# Patient Record
Sex: FEMALE | URBAN - METROPOLITAN AREA
[De-identification: names, ages, dates, MRNs, and addresses within clinical notes are randomized per-mention and may not be internally consistent; named-entity substitution may affect disease eponyms.]

---

## 2018-08-23 ENCOUNTER — APPOINTMENT (RX ONLY)
Age: 48
Setting detail: DERMATOLOGY
End: 2018-08-23

## 2018-08-23 DIAGNOSIS — L88 PYODERMA GANGRENOSUM: ICD-10-CM

## 2018-08-23 DIAGNOSIS — I87.2 VENOUS INSUFFICIENCY (CHRONIC) (PERIPHERAL): ICD-10-CM

## 2018-08-23 PROCEDURE — ? DIAGNOSIS COMMENT

## 2018-08-23 PROCEDURE — ? COUNSELING

## 2018-08-23 PROCEDURE — ? PRESCRIPTION

## 2018-08-23 PROCEDURE — ? ORDER TESTS

## 2018-08-23 PROCEDURE — 99213 OFFICE O/P EST LOW 20 MIN: CPT

## 2018-08-23 RX ORDER — PREDNISONE 5 MG/1
TABLET ORAL
Qty: 65 | Refills: 0 | Status: ERX

## 2018-08-23 RX ORDER — CEPHALEXIN 500 MG/1
CAPSULE ORAL
Qty: 40 | Refills: 0 | Status: ERX | COMMUNITY
Start: 2018-08-23

## 2018-08-23 RX ADMIN — CEPHALEXIN: 500 CAPSULE ORAL at 00:00

## 2018-08-23 ASSESSMENT — LOCATION SIMPLE DESCRIPTION DERM: LOCATION SIMPLE: RIGHT PRETIBIAL REGION

## 2018-08-23 ASSESSMENT — LOCATION ZONE DERM: LOCATION ZONE: LEG

## 2018-08-23 ASSESSMENT — LOCATION DETAILED DESCRIPTION DERM: LOCATION DETAILED: RIGHT PROXIMAL PRETIBIAL REGION

## 2018-08-23 NOTE — HPI: FREE FORM (INITIAL HISTORY)
How Severe Is Your Skin Condition? (The Patient Describes The Severity Level As....): moderate
What Brings You In Today? (This Is An Xx Year Old Patient Who Presents For...): pyogenic grangrenosum
When Did You First Notice It? (The Patient First Noticed It...): 13days ago
Where On Your Body Is It? (Located On...): right leg, near previous lesion
Any Associated Symptoms? (The Symptoms Include.....): ulceration, erythema and swelling
How Did This Start? (The Symptoms Started...): after a vacuum fell on her leg

## 2018-08-23 NOTE — PROCEDURE: DIAGNOSIS COMMENT
Detail Level: Simple
Comment: She has a component of venous stasis. She was encouraged to wear compression hose and elevate legs as much as possible.
Comment: She has an ulcer with rolled borders on the right lower leg. She has quite a bit of erythema and edema. See photos.\\n- Culture taken\\n- start cephalexin and prednisone, see Rx\\n- stressed the importance of compression hose and leg elevation. We need to treat the edema in order for the ulcer to start to heal.\\n- side effects of prednisone reviewed. She has taken it before.\\n- clobetasol to edge of ulcer, vaseline to ulcer base\\n- f/u 2 weeks, sooner if needed

## 2018-09-04 ENCOUNTER — APPOINTMENT (RX ONLY)
Age: 48
Setting detail: DERMATOLOGY
End: 2018-09-04

## 2018-09-04 DIAGNOSIS — L88 PYODERMA GANGRENOSUM: ICD-10-CM

## 2018-09-04 PROCEDURE — 99212 OFFICE O/P EST SF 10 MIN: CPT

## 2018-09-04 PROCEDURE — ? DIAGNOSIS COMMENT

## 2018-09-04 NOTE — HPI: FREE FORM (FOLLOW UP HISTORY)
How Severe Is Your Skin Condition?: moderate
What Brings You In Today For Follow Up? (This Is An Xx Year Old Patient Who Is Following Up For:): Pyoderma Gangrenosum
Where On Your Body Is It? (Located On:): right leg
What Was It Treated With? (The Condition Was Treated With:): clobetasol and vaseline
Since The Treatment Is Your Condition Better, Worse, Or Unchanged? (Since The Treatment, The Condition Is:): worse, it's larger

## 2018-09-04 NOTE — PROCEDURE: DIAGNOSIS COMMENT
Detail Level: Simple
Comment: Ulcer is bigger in diameter but not deeper. She has quite a bit of edema and erythema surrounding the ulcer. I don't think she has an infection. \\n-Discussed the importance of staying off her feet as much as possible and wearing compression hose when she is on her feet. We need to decrease the edeme in order for this ulcer to heal.\\n-Increase clobetasol from every 2 days to 1-2 times per day.\\n-hold off on further antibiotics for now\\n- Follow up in 1 week

## 2018-09-12 ENCOUNTER — APPOINTMENT (RX ONLY)
Age: 48
Setting detail: DERMATOLOGY
End: 2018-09-12

## 2018-09-12 DIAGNOSIS — I87.2 VENOUS INSUFFICIENCY (CHRONIC) (PERIPHERAL): ICD-10-CM

## 2018-09-12 DIAGNOSIS — L88 PYODERMA GANGRENOSUM: ICD-10-CM

## 2018-09-12 PROCEDURE — ? OTHER

## 2018-09-12 PROCEDURE — 99213 OFFICE O/P EST LOW 20 MIN: CPT

## 2018-09-12 PROCEDURE — ? DIAGNOSIS COMMENT

## 2018-09-12 PROCEDURE — ? ORDER TESTS

## 2018-09-12 NOTE — PROCEDURE: OTHER
Detail Level: Zone
Note Text (......Xxx Chief Complaint.): This diagnosis correlates with the
Other (Free Text): - handout given with info for [employer] so she can look into better compression hose. Discussed that the ulcer will not improve if edema present.\\n- continue clobetasol to ulcer edge, and aquaphor to ulcer base\\n- ulcer cleaned with sterile saline\\n- aqauphor, telfa, and gauze applied with hypafix\\n- Ace bandage applied for better compression\\n- keep ulcer covered when wearing compression hose.\\n- f/u one week, sooner if needed.

## 2018-09-12 NOTE — PROCEDURE: DIAGNOSIS COMMENT
Comment: still with quite a bit of edema. Recommended new compression hose at [facility] in [place]
Detail Level: Zone
Comment: Ulcer not smaller. Will reculture. If not better next week, consider antibiotics, although the previous bacteria is likely not pathogenic.
Detail Level: Simple

## 2018-09-12 NOTE — HPI: FREE FORM (FOLLOW UP HISTORY)
How Severe Is Your Skin Condition?: moderate
What Brings You In Today For Follow Up? (This Is An Xx Year Old Patient Who Is Following Up For:): Pyoderma gangrenosum and stasis dermatitis
Where On Your Body Is It? (Located On:): Right medial lower leg
What Was It Treated With? (The Condition Was Treated With:): Clobetasol and compression
Since The Treatment Is Your Condition Better, Worse, Or Unchanged? (Since The Treatment, The Condition Is:): Unchanged, patient reports that the ulcer is about the same in size.

## 2018-09-19 ENCOUNTER — RX ONLY (OUTPATIENT)
Age: 48
Setting detail: RX ONLY
End: 2018-09-19

## 2018-09-19 ENCOUNTER — APPOINTMENT (RX ONLY)
Age: 48
Setting detail: DERMATOLOGY
End: 2018-09-19

## 2018-09-19 DIAGNOSIS — L88 PYODERMA GANGRENOSUM: ICD-10-CM | Status: UNCHANGED

## 2018-09-19 DIAGNOSIS — I87.2 VENOUS INSUFFICIENCY (CHRONIC) (PERIPHERAL): ICD-10-CM

## 2018-09-19 PROCEDURE — ? PRESCRIPTION

## 2018-09-19 PROCEDURE — ? DIAGNOSIS COMMENT

## 2018-09-19 PROCEDURE — 99213 OFFICE O/P EST LOW 20 MIN: CPT

## 2018-09-19 PROCEDURE — ? OTHER

## 2018-09-19 RX ORDER — AMOXICILLIN AND CLAVULANATE POTASSIUM 875; 125 MG/1; 1/1
TABLET, FILM COATED ORAL
Qty: 14 | Refills: 0 | Status: ERX | COMMUNITY
Start: 2018-09-19

## 2018-09-19 RX ORDER — PREDNISONE 5 MG/1
TABLET ORAL
Qty: 65 | Refills: 0 | Status: ERX

## 2018-09-19 RX ADMIN — AMOXICILLIN AND CLAVULANATE POTASSIUM: 875; 125 TABLET, FILM COATED ORAL at 23:08

## 2018-09-19 ASSESSMENT — LOCATION DETAILED DESCRIPTION DERM: LOCATION DETAILED: RIGHT DISTAL PRETIBIAL REGION

## 2018-09-19 ASSESSMENT — LOCATION ZONE DERM: LOCATION ZONE: LEG

## 2018-09-19 ASSESSMENT — LOCATION SIMPLE DESCRIPTION DERM: LOCATION SIMPLE: RIGHT PRETIBIAL REGION

## 2018-09-19 NOTE — HPI: FREE FORM (FOLLOW UP HISTORY)
How Severe Is Your Skin Condition?: moderate
What Brings You In Today For Follow Up? (This Is An Xx Year Old Patient Who Is Following Up For:): Pyoderma gangrenosum and stasis dermatitis
Where On Your Body Is It? (Located On:): Right lower leg
What Was It Treated With? (The Condition Was Treated With:): Clobetasol, compression hose
Since The Treatment Is Your Condition Better, Worse, Or Unchanged? (Since The Treatment, The Condition Is:): Patient reports that the ulcer is unchanged and about the same size. She is wearing compression hose daily.\\n\\nShe presents today for a follow up evaluation.

## 2018-09-19 NOTE — PROCEDURE: OTHER
Other (Free Text): -  Aquaphor, telfa, gauze applied with hypafix \\n- Ace bandage applied\\n- Wear compression hose\\n- F/u in 1 week, sooner if needed
Note Text (......Xxx Chief Complaint.): This diagnosis correlates with the
Other (Free Text): --Her edema is better and redness is better, despite ulcer not improving \\n--she ordered compression hose from [facility] and will pick them up soon.
Detail Level: Simple

## 2018-09-19 NOTE — PROCEDURE: DIAGNOSIS COMMENT
Detail Level: Simple
Comment: ulcer is not better, somewhat bigger, although edema and redness are better. Borders are rolled and undermined. Does not look particularly infected but will treat recent positive culture since the ulcer is not getting better. Will also repeat course of prednisone since ulcer not improving. Discussed risks of systemic steroids.

## 2018-10-03 ENCOUNTER — APPOINTMENT (RX ONLY)
Age: 48
Setting detail: DERMATOLOGY
End: 2018-10-03

## 2018-10-03 DIAGNOSIS — L88 PYODERMA GANGRENOSUM: ICD-10-CM

## 2018-10-03 PROCEDURE — ? OTHER

## 2018-10-03 PROCEDURE — ? PRESCRIPTION

## 2018-10-03 PROCEDURE — 99213 OFFICE O/P EST LOW 20 MIN: CPT

## 2018-10-03 PROCEDURE — ? DIAGNOSIS COMMENT

## 2018-10-03 NOTE — PROCEDURE: DIAGNOSIS COMMENT
Detail Level: Simple
Comment: The stasis seems to be fairly well controlled, but the ulcer is larger. May need to consider biopsy since not improving. Will schedule longer appointment next week.  Also consider Coloplast to base. She should wear the compression hose from [facility] as often as possible. Will try switching to Protopic to ulcer base. She has completed prednisone. May need to consider cyclosporin since not improving. Follow-up one week, sooner if needed.

## 2018-10-03 NOTE — HPI: FREE FORM (FOLLOW UP HISTORY)
How Severe Is Your Skin Condition?: moderate
What Brings You In Today For Follow Up? (This Is An Xx Year Old Patient Who Is Following Up For:): Pyoderma gangrenosum and stasis dermatitis
Where On Your Body Is It? (Located On:): Right lower leg
What Was It Treated With? (The Condition Was Treated With:): Vaseline, Clobetasol and Augmentin
Since The Treatment Is Your Condition Better, Worse, Or Unchanged? (Since The Treatment, The Condition Is:): Unchanged, patient reports that the swelling is still down but the ulcer is about the same.

## 2018-10-03 NOTE — PROCEDURE: OTHER
Other (Free Text): -  Aquaphor, telfa, gauze applied with hypafix \\n- Ace bandage not applied, she will apply it when she gets home\\n- Wear compression hose\\n- F/u in 1 week, sooner if needed
Detail Level: Simple
Note Text (......Xxx Chief Complaint.): This diagnosis correlates with the

## 2018-10-04 RX ORDER — TACROLIMUS 1 MG/G
OINTMENT TOPICAL
Qty: 1 | Refills: 1 | Status: ERX | COMMUNITY
Start: 2018-10-04

## 2018-10-04 RX ADMIN — TACROLIMUS: 1 OINTMENT TOPICAL at 00:28

## 2018-10-17 ENCOUNTER — APPOINTMENT (RX ONLY)
Age: 48
Setting detail: DERMATOLOGY
End: 2018-10-17

## 2018-10-17 DIAGNOSIS — I87.2 VENOUS INSUFFICIENCY (CHRONIC) (PERIPHERAL): ICD-10-CM

## 2018-10-17 DIAGNOSIS — L88 PYODERMA GANGRENOSUM: ICD-10-CM

## 2018-10-17 PROCEDURE — 99213 OFFICE O/P EST LOW 20 MIN: CPT

## 2018-10-17 PROCEDURE — ? DIAGNOSIS COMMENT

## 2018-10-17 PROCEDURE — ? OTHER

## 2018-10-17 ASSESSMENT — LOCATION ZONE DERM: LOCATION ZONE: LEG

## 2018-10-17 ASSESSMENT — LOCATION SIMPLE DESCRIPTION DERM: LOCATION SIMPLE: RIGHT PRETIBIAL REGION

## 2018-10-17 ASSESSMENT — LOCATION DETAILED DESCRIPTION DERM: LOCATION DETAILED: RIGHT DISTAL PRETIBIAL REGION

## 2018-10-17 NOTE — HPI: FREE FORM (FOLLOW UP HISTORY)
How Severe Is Your Skin Condition?: moderate
What Brings You In Today For Follow Up? (This Is An Xx Year Old Patient Who Is Following Up For:): Pyoderma Gangrenosum
Where On Your Body Is It? (Located On:): Right lower leg
What Was It Treated With? (The Condition Was Treated With:): Recently she started applying Protopic ointment.\\n
Since The Treatment Is Your Condition Better, Worse, Or Unchanged? (Since The Treatment, The Condition Is:): Patient thinks that Protopic ointment makes it a little better.\\n\\nShe presents today for a follow up evaluation.

## 2018-10-17 NOTE — PROCEDURE: OTHER
Detail Level: Zone
Other (Free Text): - aquaphor, telfa, gauze and hypafix applied \\n- patient will continue Protopic once daily\\n- photo taken\\n- name of coloplast (triad hydrophilic wound dressing) given (can order on [employer]) \\n- continue compression hose daily\\n- f/u 2 weeks sooner if needed. Since the ulcer is a little better, will hold off on biopsy and more immunosuppressants, but consider if does not continue to improve.
Note Text (......Xxx Chief Complaint.): This diagnosis correlates with the

## 2018-10-17 NOTE — PROCEDURE: DIAGNOSIS COMMENT
Detail Level: Simple
Comment: Better controlled. She is wearing compression hose daily and will continue to do so.
Comment: There is more granulation tissue at base. The rolled borders are a little better.

## 2018-11-12 ENCOUNTER — APPOINTMENT (RX ONLY)
Age: 48
Setting detail: DERMATOLOGY
End: 2018-11-12

## 2018-11-12 DIAGNOSIS — I87.2 VENOUS INSUFFICIENCY (CHRONIC) (PERIPHERAL): ICD-10-CM

## 2018-11-12 DIAGNOSIS — L88 PYODERMA GANGRENOSUM: ICD-10-CM

## 2018-11-12 PROCEDURE — ? PRESCRIPTION

## 2018-11-12 PROCEDURE — 99213 OFFICE O/P EST LOW 20 MIN: CPT

## 2018-11-12 PROCEDURE — ? DIAGNOSIS COMMENT

## 2018-11-12 PROCEDURE — ? OTHER

## 2018-11-12 RX ORDER — TACROLIMUS 1 MG/G
OINTMENT TOPICAL
Qty: 1 | Refills: 6 | Status: ERX

## 2018-11-12 ASSESSMENT — LOCATION SIMPLE DESCRIPTION DERM: LOCATION SIMPLE: RIGHT PRETIBIAL REGION

## 2018-11-12 ASSESSMENT — LOCATION ZONE DERM: LOCATION ZONE: LEG

## 2018-11-12 ASSESSMENT — LOCATION DETAILED DESCRIPTION DERM: LOCATION DETAILED: RIGHT DISTAL PRETIBIAL REGION

## 2018-11-12 NOTE — HPI: FREE FORM (FOLLOW UP HISTORY)
How Severe Is Your Skin Condition?: moderate
What Brings You In Today For Follow Up? (This Is An Xx Year Old Patient Who Is Following Up For:): Pyoderma gangrenosum and stasis dermatitis
Where On Your Body Is It? (Located On:): Right lower leg
What Was It Treated With? (The Condition Was Treated With:): Protopic and compression with compression hose
Since The Treatment Is Your Condition Better, Worse, Or Unchanged? (Since The Treatment, The Condition Is:): Patient reports that her condition has improved, she thinks the ulcer is smaller in size.

## 2018-11-12 NOTE — PROCEDURE: DIAGNOSIS COMMENT
Detail Level: Simple
Comment: She has a little more edema today, but she states in general edema is better. She is wearing compression hose every day.
Comment: There is more granulation tissue at base and the ulcer is more superficial. The rolled borders better and the ulcer is a little smaller.

## 2018-12-05 ENCOUNTER — APPOINTMENT (RX ONLY)
Age: 48
Setting detail: DERMATOLOGY
End: 2018-12-05

## 2018-12-05 DIAGNOSIS — L88 PYODERMA GANGRENOSUM: ICD-10-CM

## 2018-12-05 DIAGNOSIS — I87.2 VENOUS INSUFFICIENCY (CHRONIC) (PERIPHERAL): ICD-10-CM

## 2018-12-05 PROCEDURE — 99213 OFFICE O/P EST LOW 20 MIN: CPT

## 2018-12-05 PROCEDURE — ? OTHER

## 2018-12-05 PROCEDURE — ? DIAGNOSIS COMMENT

## 2018-12-05 ASSESSMENT — LOCATION SIMPLE DESCRIPTION DERM: LOCATION SIMPLE: RIGHT PRETIBIAL REGION

## 2018-12-05 ASSESSMENT — LOCATION ZONE DERM: LOCATION ZONE: LEG

## 2018-12-05 ASSESSMENT — LOCATION DETAILED DESCRIPTION DERM: LOCATION DETAILED: RIGHT DISTAL PRETIBIAL REGION

## 2018-12-05 NOTE — PROCEDURE: OTHER
Detail Level: Zone
Other (Free Text): - aquaphor, telfa, and hypafix applied \\n - patient will wrap with ACE bandage when she gets home\\n - patient will continue Protopic twice daily.\\n- photo taken\\n- continue compression hose daily\\n- f/u 3 weeks sooner if needed
Note Text (......Xxx Chief Complaint.): This diagnosis correlates with the

## 2018-12-05 NOTE — HPI: FREE FORM (FOLLOW UP HISTORY)
How Severe Is Your Skin Condition?: moderate
What Brings You In Today For Follow Up? (This Is An Xx Year Old Patient Who Is Following Up For:): Pyoderma gangrenosum and stasis dermatitis
Where On Your Body Is It? (Located On:): Right lower leg
What Was It Treated With? (The Condition Was Treated With:): Protopic and co pression with compression hose
Since The Treatment Is Your Condition Better, Worse, Or Unchanged? (Since The Treatment, The Condition Is:): Better, patient reports that the ulcer is improving and that she thinks its smaller.  Her stasis derm is stable, she reports that its only bad in the summer due to the heat and not being able to wear compression hose.

## 2018-12-05 NOTE — PROCEDURE: DIAGNOSIS COMMENT
Detail Level: Simple
Comment: Doing well. Continue compression hose daily.
Comment: Continues to improve. Ulcer is smaller and the rolled borders have basically resolved.There is quite a bit of granulation tissue.

## 2019-04-02 ENCOUNTER — APPOINTMENT (RX ONLY)
Age: 49
Setting detail: DERMATOLOGY
End: 2019-04-02

## 2019-04-02 DIAGNOSIS — L88 PYODERMA GANGRENOSUM: ICD-10-CM

## 2019-04-02 DIAGNOSIS — L82.1 OTHER SEBORRHEIC KERATOSIS: ICD-10-CM

## 2019-04-02 DIAGNOSIS — M71 OTHER BURSOPATHIES: ICD-10-CM

## 2019-04-02 PROBLEM — M71.342 OTHER BURSAL CYST, LEFT HAND: Status: ACTIVE | Noted: 2019-04-02

## 2019-04-02 PROCEDURE — ? OTHER

## 2019-04-02 PROCEDURE — ? COUNSELING

## 2019-04-02 PROCEDURE — 99213 OFFICE O/P EST LOW 20 MIN: CPT

## 2019-04-02 PROCEDURE — ? DIAGNOSIS COMMENT

## 2019-04-02 ASSESSMENT — LOCATION ZONE DERM
LOCATION ZONE: LEG
LOCATION ZONE: FINGER
LOCATION ZONE: FACE

## 2019-04-02 ASSESSMENT — LOCATION SIMPLE DESCRIPTION DERM
LOCATION SIMPLE: LEFT INDEX FINGER
LOCATION SIMPLE: RIGHT PRETIBIAL REGION
LOCATION SIMPLE: RIGHT TEMPLE

## 2019-04-02 ASSESSMENT — LOCATION DETAILED DESCRIPTION DERM
LOCATION DETAILED: RIGHT DISTAL PRETIBIAL REGION
LOCATION DETAILED: RIGHT CENTRAL TEMPLE
LOCATION DETAILED: LEFT DISTAL DORSAL INDEX FINGER

## 2019-04-02 NOTE — PROCEDURE: OTHER
Other (Free Text): --recommended she be seen by a hand surgeon for further evaluation and treatment, given risk of infection when treating these. She does have some indentation of the nail because of the cyst.
Detail Level: Zone
Note Text (......Xxx Chief Complaint.): This diagnosis correlates with the

## 2019-04-02 NOTE — HPI: FREE FORM (INITIAL HISTORY)
How Severe Is Your Skin Condition? (The Patient Describes The Severity Level As....): moderate
What Brings You In Today? (This Is An Xx Year Old Patient Who Presents For...): lesion
When Did You First Notice It? (The Patient First Noticed It...): months ago
Where On Your Body Is It? (Located On...): left index finger
Any Associated Symptoms? (The Symptoms Include.....): occurred after a burn in this area
What Previous Treatments Have You Tried? (The Patient Has Tried The Following Treatments...): no previous treatment
Did Anything Happen To Make You Want To Come In For This Specifically Today? (The Specific Reason That The Patient Came In Today Was Because:): evaluation and management. She would like the lesion treated/removed.\\nThe pyoderma gangrenosum on the right lower leg has basically completely filled in/healed. She is wearing compression hose daily.

## 2019-04-02 NOTE — PROCEDURE: DIAGNOSIS COMMENT
Detail Level: Simple
Comment: vs pigmented AK. Discussed LN2, but she would like to monitor for now. Discussed the importance of sun protection. Notify clinic with changes.
Comment: Basically clear. Continue compression hose daily. Notify clinic with new ulcer or break in the skin.

## 2020-03-19 ENCOUNTER — APPOINTMENT (RX ONLY)
Age: 50
Setting detail: DERMATOLOGY
End: 2020-03-19

## 2020-03-19 DIAGNOSIS — L88 PYODERMA GANGRENOSUM: ICD-10-CM

## 2020-03-19 DIAGNOSIS — L57.0 ACTINIC KERATOSIS: ICD-10-CM

## 2020-03-19 PROCEDURE — ? LIQUID NITROGEN

## 2020-03-19 PROCEDURE — 99213 OFFICE O/P EST LOW 20 MIN: CPT | Mod: 25

## 2020-03-19 PROCEDURE — 17000 DESTRUCT PREMALG LESION: CPT

## 2020-03-19 PROCEDURE — ? DIAGNOSIS COMMENT

## 2020-03-19 ASSESSMENT — LOCATION SIMPLE DESCRIPTION DERM
LOCATION SIMPLE: RIGHT EYEBROW
LOCATION SIMPLE: RIGHT PRETIBIAL REGION

## 2020-03-19 ASSESSMENT — LOCATION DETAILED DESCRIPTION DERM
LOCATION DETAILED: RIGHT DISTAL PRETIBIAL REGION
LOCATION DETAILED: RIGHT LATERAL EYEBROW

## 2020-03-19 ASSESSMENT — LOCATION ZONE DERM
LOCATION ZONE: FACE
LOCATION ZONE: LEG

## 2020-03-19 NOTE — PROCEDURE: LIQUID NITROGEN
Detail Level: Simple
Render Post-Care Instructions In Note?: yes
Render Note In Bullet Format When Appropriate: No
Consent: The patient's consent was obtained including but not limited to risks of crusting, scabbing, blistering, scarring, darker or lighter pigmentary change, recurrence, incomplete removal and infection. Patient understands that if the lesion does not completely clear with this treatment they are to return for re-evaluation and possible biopsy.
Duration Of Freeze Thaw-Cycle (Seconds): 0
Post-Care Instructions: Patient given [facility] handout for post liquid nitrogen care.

## 2020-03-19 NOTE — HPI: FREE FORM (INITIAL HISTORY)
How Severe Is Your Skin Condition? (The Patient Describes The Severity Level As....): mild
What Brings You In Today? (This Is An Xx Year Old Patient Who Presents For...): lesion
When Did You First Notice It? (The Patient First Noticed It...): years ago
Where On Your Body Is It? (Located On...): right temple
Any Associated Symptoms? (The Symptoms Include.....): darkening
What Previous Treatments Have You Tried? (The Patient Has Tried The Following Treatments...): no treatment
Did Anything Happen To Make You Want To Come In For This Specifically Today? (The Specific Reason That The Patient Came In Today Was Because:): evaluation and management.\\nNo flares of PG since last visit.

## 2020-03-19 NOTE — PROCEDURE: DIAGNOSIS COMMENT
Detail Level: Simple
Comment: vs inflamed flat SK vs other. She will f/u for consideration of biopsy if lesion does not resolve with today's treatment
Comment: No recent flares. She has a large scar on right lower leg. Continue compression hose daily.

## 2024-01-23 ENCOUNTER — APPOINTMENT (RX ONLY)
Age: 54
Setting detail: DERMATOLOGY
End: 2024-01-23

## 2024-01-23 DIAGNOSIS — L88 PYODERMA GANGRENOSUM: ICD-10-CM

## 2024-01-23 DIAGNOSIS — D49.2 NEOPLASM OF UNSPECIFIED BEHAVIOR OF BONE, SOFT TISSUE, AND SKIN: ICD-10-CM

## 2024-01-23 PROCEDURE — ? DIAGNOSIS COMMENT

## 2024-01-23 PROCEDURE — ? PRESCRIPTION

## 2024-01-23 PROCEDURE — 99203 OFFICE O/P NEW LOW 30 MIN: CPT

## 2024-01-23 PROCEDURE — ? COUNSELING

## 2024-01-23 RX ORDER — PREDNISONE 5 MG/1
TABLET ORAL
Qty: 65 | Refills: 0 | Status: ERX | COMMUNITY
Start: 2024-01-23

## 2024-01-23 RX ORDER — TACROLIMUS 1 MG/G
OINTMENT TOPICAL
Qty: 30 | Refills: 6 | Status: ERX | COMMUNITY
Start: 2024-01-23

## 2024-01-23 RX ADMIN — TACROLIMUS: 1 OINTMENT TOPICAL at 00:00

## 2024-01-23 RX ADMIN — PREDNISONE: 5 TABLET ORAL at 00:00

## 2024-01-23 ASSESSMENT — LOCATION SIMPLE DESCRIPTION DERM
LOCATION SIMPLE: RIGHT ANKLE
LOCATION SIMPLE: RIGHT PRETIBIAL REGION

## 2024-01-23 ASSESSMENT — LOCATION DETAILED DESCRIPTION DERM
LOCATION DETAILED: RIGHT DISTAL PRETIBIAL REGION
LOCATION DETAILED: RIGHT ANKLE

## 2024-01-23 ASSESSMENT — LOCATION ZONE DERM: LOCATION ZONE: LEG

## 2024-01-23 NOTE — PROCEDURE: DIAGNOSIS COMMENT
Render Risk Assessment In Note?: no
Detail Level: Simple
Comment: Ulcer on right shin in similar location as previous ulcers. May have been induced by knee swelling (she has stasis changes) although she has been wearing compression socks.\\nWill start prednisone taper that she has taken before and restart tacrolimus ointment. Continue compression socks. F/u in 2 weeks, sooner if needed. Discussed SEs of oral steroids, including osteoporosis. Take calcium and Vit D
Comment: Will plan to biopsy once her ulcer has healed and she is back from vacation. Lesion is suspicious for a BCC

## 2024-02-07 ENCOUNTER — APPOINTMENT (RX ONLY)
Age: 54
Setting detail: DERMATOLOGY
End: 2024-02-07

## 2024-02-07 DIAGNOSIS — L88 PYODERMA GANGRENOSUM: ICD-10-CM

## 2024-02-07 PROCEDURE — ? TREATMENT REGIMEN

## 2024-02-07 PROCEDURE — 99213 OFFICE O/P EST LOW 20 MIN: CPT

## 2024-02-07 PROCEDURE — ? COUNSELING

## 2024-02-07 PROCEDURE — ? DIAGNOSIS COMMENT

## 2024-02-07 ASSESSMENT — LOCATION DETAILED DESCRIPTION DERM: LOCATION DETAILED: RIGHT DISTAL PRETIBIAL REGION

## 2024-02-07 ASSESSMENT — LOCATION SIMPLE DESCRIPTION DERM: LOCATION SIMPLE: RIGHT PRETIBIAL REGION

## 2024-02-07 ASSESSMENT — LOCATION ZONE DERM: LOCATION ZONE: LEG

## 2024-02-07 NOTE — PROCEDURE: TREATMENT REGIMEN
Modify Regimen: --she will purchase new compression socks, as these are old and not controlling the edema. She should wear compression socks as often as possible\\n--elevate when sitting, when possible\\n--put compression socks on first thing in the morning\\n--try to increase use of tacrolimus by applying vaseline or Aquaphor first\\n--continue non-adhesive dressing\\n--finish last 3 days of prednisone. Will hold off on further prednisone for now. Discussed cyclosporin but will hold off for now, given the SEs\\n--f/u in 2 weeks, call sooner if needed
Detail Level: Zone

## 2024-02-07 NOTE — PROCEDURE: DIAGNOSIS COMMENT
Detail Level: Simple
Render Risk Assessment In Note?: no
Comment: Ulcer is somewhat larger. She does not have significant granulation tissue yet. There is some pitting edema, especially inferior to the ulcer.

## 2024-02-07 NOTE — HPI: FREE FORM (FOLLOW UP HISTORY)
What Brings You In Today For Follow Up? (This Is An Xx Year Old Patient Who Is Following Up For:): Pyoderma Gangrenosum

## 2024-02-29 ENCOUNTER — APPOINTMENT (RX ONLY)
Age: 54
Setting detail: DERMATOLOGY
End: 2024-02-29

## 2024-02-29 DIAGNOSIS — L88 PYODERMA GANGRENOSUM: ICD-10-CM

## 2024-02-29 PROCEDURE — ? COUNSELING

## 2024-02-29 PROCEDURE — ? TREATMENT REGIMEN

## 2024-02-29 PROCEDURE — ? DIAGNOSIS COMMENT

## 2024-02-29 PROCEDURE — 99213 OFFICE O/P EST LOW 20 MIN: CPT

## 2024-02-29 ASSESSMENT — LOCATION ZONE DERM: LOCATION ZONE: LEG

## 2024-02-29 ASSESSMENT — LOCATION SIMPLE DESCRIPTION DERM: LOCATION SIMPLE: RIGHT PRETIBIAL REGION

## 2024-02-29 ASSESSMENT — LOCATION DETAILED DESCRIPTION DERM: LOCATION DETAILED: RIGHT DISTAL PRETIBIAL REGION

## 2024-02-29 NOTE — PROCEDURE: DIAGNOSIS COMMENT
Render Risk Assessment In Note?: no
Detail Level: Simple
Comment: Ulcer is larger at 3cm but with more granulation tissue and edges are not as rolled. Edema is better.

## 2024-02-29 NOTE — HPI: FREE FORM (FOLLOW UP HISTORY)
What Brings You In Today For Follow Up? (This Is An Xx Year Old Patient Who Is Following Up For:): pyoderma gangrenosum
Where On Your Body Is It? (Located On:): right lower leg
What Was It Treated With? (The Condition Was Treated With:): compression, elevation, tacrolimus. She is off prednisone
Since The Treatment Is Your Condition Better, Worse, Or Unchanged? (Since The Treatment, The Condition Is:): ulcer is larger but she is seeing more more granulation tissue. She is trying to control the edema more. There is some itching but no pain. She is tolerating the tacrolimus better (less burning.)

## 2024-02-29 NOTE — PROCEDURE: TREATMENT REGIMEN
Detail Level: Zone
Continue Regimen: --continue edema control with compression socks, elevation and wrapping\\n--tacrolimus BID\\n--clobetasol can be used for itch\\n--f/u in 3 weeks, sooner if needed

## 2024-03-20 ENCOUNTER — APPOINTMENT (RX ONLY)
Age: 54
Setting detail: DERMATOLOGY
End: 2024-03-20

## 2024-03-20 DIAGNOSIS — L88 PYODERMA GANGRENOSUM: ICD-10-CM

## 2024-03-20 PROCEDURE — ? TREATMENT REGIMEN

## 2024-03-20 PROCEDURE — ? ORDER TESTS

## 2024-03-20 PROCEDURE — ? PRESCRIPTION

## 2024-03-20 PROCEDURE — ? DIAGNOSIS COMMENT

## 2024-03-20 PROCEDURE — 99213 OFFICE O/P EST LOW 20 MIN: CPT

## 2024-03-20 PROCEDURE — ? COUNSELING

## 2024-03-20 RX ORDER — CEPHALEXIN 500 MG/1
CAPSULE ORAL
Qty: 30 | Refills: 0 | Status: ERX | COMMUNITY
Start: 2024-03-20

## 2024-03-20 RX ADMIN — CEPHALEXIN: 500 CAPSULE ORAL at 00:00

## 2024-03-20 ASSESSMENT — LOCATION DETAILED DESCRIPTION DERM: LOCATION DETAILED: RIGHT DISTAL PRETIBIAL REGION

## 2024-03-20 ASSESSMENT — LOCATION SIMPLE DESCRIPTION DERM: LOCATION SIMPLE: RIGHT PRETIBIAL REGION

## 2024-03-20 ASSESSMENT — LOCATION ZONE DERM: LOCATION ZONE: LEG

## 2024-03-20 NOTE — PROCEDURE: TREATMENT REGIMEN
Detail Level: Zone
Continue Regimen: --continue edema control with compression socks, elevation and wrapping\\n--clobetasol BID\\n--cephalexin three times a day for 10 days\\n--f/u in 3 weeks, sooner if needed

## 2024-03-20 NOTE — PROCEDURE: DIAGNOSIS COMMENT
Comment: ulcer larger but less rolled borders. Will try to hold off on additional prednisone but may need to consider cyclosporin if not better. Culture taken.
Detail Level: Simple
Detail Level: Zone
Comment: Ulcer is larger at 3cm but with more granulation tissue and edges are not as rolled. Edema is better.
Render Risk Assessment In Note?: no

## 2024-05-14 ENCOUNTER — APPOINTMENT (RX ONLY)
Age: 54
Setting detail: DERMATOLOGY
End: 2024-05-14

## 2024-05-14 DIAGNOSIS — L88 PYODERMA GANGRENOSUM: ICD-10-CM

## 2024-05-14 PROCEDURE — ? PRESCRIPTION

## 2024-05-22 RX ORDER — CLOBETASOL PROPIONATE 0.5 MG/G
CREAM TOPICAL BID
Qty: 45 | Refills: 1 | Status: ERX | COMMUNITY
Start: 2024-05-22

## 2024-05-22 RX ADMIN — CLOBETASOL PROPIONATE: 0.5 CREAM TOPICAL at 00:00

## 2024-06-12 ENCOUNTER — APPOINTMENT (RX ONLY)
Age: 54
Setting detail: DERMATOLOGY
End: 2024-06-12

## 2024-06-12 DIAGNOSIS — L88 PYODERMA GANGRENOSUM: ICD-10-CM

## 2024-06-12 PROCEDURE — ? TREATMENT REGIMEN

## 2024-06-12 PROCEDURE — ? COUNSELING

## 2024-06-12 PROCEDURE — 99213 OFFICE O/P EST LOW 20 MIN: CPT

## 2024-06-12 PROCEDURE — ? DIAGNOSIS COMMENT

## 2024-06-12 ASSESSMENT — LOCATION DETAILED DESCRIPTION DERM: LOCATION DETAILED: RIGHT DISTAL PRETIBIAL REGION

## 2024-06-12 ASSESSMENT — LOCATION ZONE DERM: LOCATION ZONE: LEG

## 2024-06-12 ASSESSMENT — LOCATION SIMPLE DESCRIPTION DERM: LOCATION SIMPLE: RIGHT PRETIBIAL REGION

## 2024-06-12 NOTE — PROCEDURE: DIAGNOSIS COMMENT
Comment: ulcer larger overall, although the original ulcer smaller. We discussed referral to wound care center or a trial of cyclosporin but she wanted to hold off for now.
Detail Level: Simple
Comment: Ulcer is larger at 3cm but with more granulation tissue and edges are not as rolled. Edema is better.
Detail Level: Zone
Render Risk Assessment In Note?: no

## 2024-06-12 NOTE — PROCEDURE: TREATMENT REGIMEN
Continue Regimen: --continue edema control with compression socks, elevation and wrapping\\n--continue clobetasol BID and vaseline\\n--send photo in 2 weeks
Detail Level: Zone

## 2025-07-17 ENCOUNTER — APPOINTMENT (OUTPATIENT)
Age: 55
Setting detail: DERMATOLOGY
End: 2025-07-17

## 2025-07-17 DIAGNOSIS — D49.2 NEOPLASM OF UNSPECIFIED BEHAVIOR OF BONE, SOFT TISSUE, AND SKIN: ICD-10-CM

## 2025-07-17 DIAGNOSIS — L30.9 DERMATITIS, UNSPECIFIED: ICD-10-CM

## 2025-07-17 DIAGNOSIS — L90.5 SCAR CONDITIONS AND FIBROSIS OF SKIN: ICD-10-CM

## 2025-07-17 PROCEDURE — ? PRESCRIPTION

## 2025-07-17 PROCEDURE — ? DIAGNOSIS COMMENT

## 2025-07-17 PROCEDURE — ? PATIENT SPECIFIC COUNSELING

## 2025-07-17 RX ORDER — PREDNISONE 5 MG/1
TABLET ORAL
Qty: 65 | Refills: 0 | Status: ERX

## 2025-07-17 RX ORDER — CEPHALEXIN 500 MG/1
CAPSULE ORAL
Qty: 42 | Refills: 1 | Status: ERX

## 2025-07-17 ASSESSMENT — LOCATION SIMPLE DESCRIPTION DERM
LOCATION SIMPLE: LEFT PRETIBIAL REGION
LOCATION SIMPLE: LEFT THIGH
LOCATION SIMPLE: RIGHT PRETIBIAL REGION

## 2025-07-17 ASSESSMENT — LOCATION ZONE DERM: LOCATION ZONE: LEG

## 2025-07-17 ASSESSMENT — LOCATION DETAILED DESCRIPTION DERM
LOCATION DETAILED: LEFT PROXIMAL PRETIBIAL REGION
LOCATION DETAILED: RIGHT DISTAL PRETIBIAL REGION
LOCATION DETAILED: LEFT ANTERIOR DISTAL THIGH

## 2025-07-17 NOTE — HPI: FREE FORM (INITIAL HISTORY)
What Brings You In Today? (This Is An Xx Year Old Patient Who Presents For...): skin lesion
When Did You First Notice It? (The Patient First Noticed It...): 1 day ago
Where On Your Body Is It? (Located On...): left medial thigh
Any Associated Symptoms? (The Symptoms Include.....): tenderness
What Previous Treatments Have You Tried? (The Patient Has Tried The Following Treatments...): no treatment
Did Anything Happen To Make You Want To Come In For This Specifically Today? (The Specific Reason That The Patient Came In Today Was Because:): Pt complains of a new affected area on the left interior thigh. It started yesterday. It is stinging, sensitive, and enlarging. Lesion is consistent with how her pyoderma gangrenosum first presented.

## 2025-07-17 NOTE — PROCEDURE: DIAGNOSIS COMMENT
Comment: Firm plaque on left medial inferior thigh with central dusking/ecchymosis. She has another firm plaque on the left proximal lower leg with no central color change. She states this is where a dog ran into her recently. She reports her previous lesions of PG started like the thigh site (I have seen her with ulcers.) ddx includes early Pyoderma Gangrenosum vs. panniculitis. We discussed biopsy but do not want to start a non-healing wound. Her previous ulcers started with an injury to her skin.
Detail Level: Zone
Render Risk Assessment In Note?: no
Detail Level: Simple
Comment: the ulcer from last year eventually healed with scarring
Comment: she continues to have a lesion concerning for BCC on the left distal lower leg, near the ankle. Will hold off on bx today given other lesion and initiation of prednisone, but recommended she f/u for a small biopsy of this lesion after the left thigh heals.

## 2025-07-17 NOTE — PROCEDURE: PATIENT SPECIFIC COUNSELING
Discussed biopsy, oral antibiotics, prednisone. Pt is not interested in biopsy, she would like to start oral antibiotics and prednisone. She is has no fevers or other symptoms of systemic infection. She will send photo or will call early next week to update us on her progress. My cell phone number was given to pt in case there are issues over the weekend. We discussed SEs of prednisone at some length, given she has taken multiple courses over her lifetime. Recommended calcium and Vit D with the prednisone.
Detail Level: Simple